# Patient Record
Sex: FEMALE | Race: WHITE | NOT HISPANIC OR LATINO | Employment: FULL TIME | URBAN - METROPOLITAN AREA
[De-identification: names, ages, dates, MRNs, and addresses within clinical notes are randomized per-mention and may not be internally consistent; named-entity substitution may affect disease eponyms.]

---

## 2018-02-01 ENCOUNTER — OFFICE VISIT (OUTPATIENT)
Dept: URGENT CARE | Facility: CLINIC | Age: 43
End: 2018-02-01
Payer: COMMERCIAL

## 2018-02-01 VITALS
WEIGHT: 112 LBS | HEART RATE: 110 BPM | DIASTOLIC BLOOD PRESSURE: 76 MMHG | SYSTOLIC BLOOD PRESSURE: 113 MMHG | BODY MASS INDEX: 21.14 KG/M2 | TEMPERATURE: 101 F | RESPIRATION RATE: 16 BRPM | HEIGHT: 61 IN | OXYGEN SATURATION: 98 %

## 2018-02-01 DIAGNOSIS — J11.1 INFLUENZA: Primary | ICD-10-CM

## 2018-02-01 DIAGNOSIS — R50.9 FEVER, UNSPECIFIED FEVER CAUSE: ICD-10-CM

## 2018-02-01 LAB
CTP QC/QA: YES
CTP QC/QA: YES
FLUAV AG NPH QL: NEGATIVE
FLUBV AG NPH QL: NEGATIVE
S PYO RRNA THROAT QL PROBE: NEGATIVE

## 2018-02-01 PROCEDURE — 87804 INFLUENZA ASSAY W/OPTIC: CPT | Mod: QW,S$GLB,, | Performed by: EMERGENCY MEDICINE

## 2018-02-01 PROCEDURE — 99203 OFFICE O/P NEW LOW 30 MIN: CPT | Mod: S$GLB,,, | Performed by: EMERGENCY MEDICINE

## 2018-02-01 PROCEDURE — 87880 STREP A ASSAY W/OPTIC: CPT | Mod: QW,S$GLB,, | Performed by: EMERGENCY MEDICINE

## 2018-02-01 PROCEDURE — 3008F BODY MASS INDEX DOCD: CPT | Mod: S$GLB,,, | Performed by: EMERGENCY MEDICINE

## 2018-02-01 RX ORDER — OSELTAMIVIR PHOSPHATE 75 MG/1
75 CAPSULE ORAL 2 TIMES DAILY
Qty: 10 CAPSULE | Refills: 0 | Status: SHIPPED | OUTPATIENT
Start: 2018-02-01 | End: 2018-02-06

## 2018-02-01 NOTE — PROGRESS NOTES
"Subjective:       Patient ID: Bobbi Viramontes is a 42 y.o. female.    Vitals:  height is 5' 1" (1.549 m) and weight is 50.8 kg (112 lb). Her temperature is 101.1 °F (38.4 °C) (abnormal). Her blood pressure is 113/76 and her pulse is 110. Her respiration is 16 and oxygen saturation is 98%.     Chief Complaint: Sore Throat and Fever    Sore Throat    This is a new problem. The current episode started in the past 7 days. The problem has been gradually worsening. Associated symptoms include congestion. Pertinent negatives include no abdominal pain, coughing, ear pain, headaches, hoarse voice or shortness of breath.   Fever    Associated symptoms include congestion and a sore throat. Pertinent negatives include no abdominal pain, chest pain, coughing, ear pain, headaches, nausea or wheezing.     Review of Systems   Constitution: Positive for fever. Negative for chills and malaise/fatigue.   HENT: Positive for congestion and sore throat. Negative for ear pain and hoarse voice.    Eyes: Negative for discharge and redness.   Cardiovascular: Negative for chest pain, dyspnea on exertion and leg swelling.   Respiratory: Negative for cough, shortness of breath, sputum production and wheezing.    Musculoskeletal: Negative for myalgias.   Gastrointestinal: Negative for abdominal pain and nausea.   Neurological: Negative for headaches.       Objective:      Physical Exam   Constitutional: She is oriented to person, place, and time. She appears well-developed and well-nourished. She is cooperative.  Non-toxic appearance. She does not appear ill. No distress.   HENT:   Head: Normocephalic and atraumatic.   Right Ear: Hearing, tympanic membrane, external ear and ear canal normal.   Left Ear: Hearing, tympanic membrane, external ear and ear canal normal.   Nose: Nose normal. No mucosal edema, rhinorrhea or nasal deformity. No epistaxis. Right sinus exhibits no maxillary sinus tenderness and no frontal sinus tenderness. Left sinus " exhibits no maxillary sinus tenderness and no frontal sinus tenderness.   Mouth/Throat: Uvula is midline, oropharynx is clear and moist and mucous membranes are normal. No trismus in the jaw. Normal dentition. No uvula swelling. No posterior oropharyngeal erythema.   Eyes: Conjunctivae and lids are normal. Right eye exhibits no discharge. Left eye exhibits no discharge. No scleral icterus.   Sclera clear bilat   Neck: Trachea normal, normal range of motion, full passive range of motion without pain and phonation normal. Neck supple.   Cardiovascular: Normal rate, regular rhythm, normal heart sounds, intact distal pulses and normal pulses.    Pulmonary/Chest: Effort normal and breath sounds normal. No respiratory distress.   Abdominal: Soft. Normal appearance. She exhibits no pulsatile midline mass.   Musculoskeletal: Normal range of motion. She exhibits no edema or deformity.   Neurological: She is alert and oriented to person, place, and time. She exhibits normal muscle tone. Coordination normal.   Skin: Skin is warm, dry and intact. She is not diaphoretic. No pallor.   Psychiatric: She has a normal mood and affect. Her speech is normal and behavior is normal. Judgment and thought content normal. Cognition and memory are normal.   Nursing note and vitals reviewed.      Assessment:       1. Influenza    2. Fever, unspecified fever cause        Plan:         Influenza  -     oseltamivir (TAMIFLU) 75 MG capsule; Take 1 capsule (75 mg total) by mouth 2 (two) times daily.  Dispense: 10 capsule; Refill: 0    Fever, unspecified fever cause  -     POCT Influenza A/B  -     POCT rapid strep A      Influenza screen and strep screen are negative.

## 2020-08-05 ENCOUNTER — OCCUPATIONAL HEALTH (OUTPATIENT)
Dept: URGENT CARE | Facility: CLINIC | Age: 45
End: 2020-08-05
Payer: COMMERCIAL

## 2020-08-05 VITALS — TEMPERATURE: 98 F | OXYGEN SATURATION: 98 %

## 2020-08-05 DIAGNOSIS — Z02.1 PRE-EMPLOYMENT EXAMINATION: Primary | ICD-10-CM

## 2020-08-05 PROCEDURE — 80305 OOH NON-DOT DRUG SCREEN: ICD-10-PCS | Mod: S$GLB,,, | Performed by: FAMILY MEDICINE

## 2020-08-05 PROCEDURE — 99499 PHYSICAL, BASIC COMPLEXITY: ICD-10-PCS | Mod: S$GLB,,, | Performed by: FAMILY MEDICINE

## 2020-08-05 PROCEDURE — U0003 INFECTIOUS AGENT DETECTION BY NUCLEIC ACID (DNA OR RNA); SEVERE ACUTE RESPIRATORY SYNDROME CORONAVIRUS 2 (SARS-COV-2) (CORONAVIRUS DISEASE [COVID-19]), AMPLIFIED PROBE TECHNIQUE, MAKING USE OF HIGH THROUGHPUT TECHNOLOGIES AS DESCRIBED BY CMS-2020-01-R: HCPCS

## 2020-08-05 PROCEDURE — 86580 TB INTRADERMAL TEST: CPT | Mod: S$GLB,,, | Performed by: FAMILY MEDICINE

## 2020-08-05 PROCEDURE — 80305 DRUG TEST PRSMV DIR OPT OBS: CPT | Mod: S$GLB,,, | Performed by: FAMILY MEDICINE

## 2020-08-05 PROCEDURE — 99499 UNLISTED E&M SERVICE: CPT | Mod: S$GLB,,, | Performed by: FAMILY MEDICINE

## 2020-08-05 PROCEDURE — 86580 POCT TB SKIN TEST: ICD-10-PCS | Mod: S$GLB,,, | Performed by: FAMILY MEDICINE

## 2020-08-06 LAB — SARS-COV-2 RNA RESP QL NAA+PROBE: NOT DETECTED

## 2020-08-07 ENCOUNTER — TELEPHONE (OUTPATIENT)
Dept: URGENT CARE | Facility: CLINIC | Age: 45
End: 2020-08-07

## 2020-08-07 NOTE — TELEPHONE ENCOUNTER
Attempt #1 made to patient regarding her COVID-19 (NOT DETECTED) results, but no answer. Unable to LVM. Thanks.

## 2020-08-09 ENCOUNTER — TELEPHONE (OUTPATIENT)
Dept: URGENT CARE | Facility: CLINIC | Age: 45
End: 2020-08-09

## 2020-08-10 ENCOUNTER — TELEPHONE (OUTPATIENT)
Dept: URGENT CARE | Facility: CLINIC | Age: 45
End: 2020-08-10

## 2022-03-17 ENCOUNTER — OFFICE VISIT (OUTPATIENT)
Dept: ENDOCRINOLOGY | Facility: CLINIC | Age: 47
End: 2022-03-17
Payer: COMMERCIAL

## 2022-03-17 VITALS
BODY MASS INDEX: 22.11 KG/M2 | WEIGHT: 112.63 LBS | SYSTOLIC BLOOD PRESSURE: 100 MMHG | DIASTOLIC BLOOD PRESSURE: 68 MMHG | HEIGHT: 60 IN | HEART RATE: 83 BPM | OXYGEN SATURATION: 99 %

## 2022-03-17 DIAGNOSIS — E03.8 HYPOTHYROIDISM DUE TO HASHIMOTO'S THYROIDITIS: ICD-10-CM

## 2022-03-17 DIAGNOSIS — E06.3 HYPOTHYROIDISM DUE TO HASHIMOTO'S THYROIDITIS: ICD-10-CM

## 2022-03-17 PROBLEM — F32.A DEPRESSION: Status: ACTIVE | Noted: 2022-03-17

## 2022-03-17 PROCEDURE — 1159F MED LIST DOCD IN RCRD: CPT | Mod: CPTII,S$GLB,, | Performed by: INTERNAL MEDICINE

## 2022-03-17 PROCEDURE — 99999 PR PBB SHADOW E&M-EST. PATIENT-LVL IV: ICD-10-PCS | Mod: PBBFAC,,, | Performed by: INTERNAL MEDICINE

## 2022-03-17 PROCEDURE — 99999 PR PBB SHADOW E&M-EST. PATIENT-LVL IV: CPT | Mod: PBBFAC,,, | Performed by: INTERNAL MEDICINE

## 2022-03-17 PROCEDURE — 99204 PR OFFICE/OUTPT VISIT, NEW, LEVL IV, 45-59 MIN: ICD-10-PCS | Mod: S$GLB,,, | Performed by: INTERNAL MEDICINE

## 2022-03-17 PROCEDURE — 99204 OFFICE O/P NEW MOD 45 MIN: CPT | Mod: S$GLB,,, | Performed by: INTERNAL MEDICINE

## 2022-03-17 PROCEDURE — 3078F DIAST BP <80 MM HG: CPT | Mod: CPTII,S$GLB,, | Performed by: INTERNAL MEDICINE

## 2022-03-17 PROCEDURE — 3074F SYST BP LT 130 MM HG: CPT | Mod: CPTII,S$GLB,, | Performed by: INTERNAL MEDICINE

## 2022-03-17 PROCEDURE — 3008F BODY MASS INDEX DOCD: CPT | Mod: CPTII,S$GLB,, | Performed by: INTERNAL MEDICINE

## 2022-03-17 PROCEDURE — 1160F RVW MEDS BY RX/DR IN RCRD: CPT | Mod: CPTII,S$GLB,, | Performed by: INTERNAL MEDICINE

## 2022-03-17 PROCEDURE — 3008F PR BODY MASS INDEX (BMI) DOCUMENTED: ICD-10-PCS | Mod: CPTII,S$GLB,, | Performed by: INTERNAL MEDICINE

## 2022-03-17 PROCEDURE — 3078F PR MOST RECENT DIASTOLIC BLOOD PRESSURE < 80 MM HG: ICD-10-PCS | Mod: CPTII,S$GLB,, | Performed by: INTERNAL MEDICINE

## 2022-03-17 PROCEDURE — 3074F PR MOST RECENT SYSTOLIC BLOOD PRESSURE < 130 MM HG: ICD-10-PCS | Mod: CPTII,S$GLB,, | Performed by: INTERNAL MEDICINE

## 2022-03-17 PROCEDURE — 1160F PR REVIEW ALL MEDS BY PRESCRIBER/CLIN PHARMACIST DOCUMENTED: ICD-10-PCS | Mod: CPTII,S$GLB,, | Performed by: INTERNAL MEDICINE

## 2022-03-17 PROCEDURE — 1159F PR MEDICATION LIST DOCUMENTED IN MEDICAL RECORD: ICD-10-PCS | Mod: CPTII,S$GLB,, | Performed by: INTERNAL MEDICINE

## 2022-03-17 RX ORDER — LEVOTHYROXINE SODIUM 88 UG/1
88 TABLET ORAL EVERY MORNING
COMMUNITY
Start: 2022-03-07 | End: 2022-06-10 | Stop reason: SDUPTHER

## 2022-03-17 RX ORDER — PROPRANOLOL HYDROCHLORIDE 10 MG/1
10 TABLET ORAL DAILY
COMMUNITY
Start: 2022-03-07

## 2022-03-17 NOTE — ASSESSMENT & PLAN NOTE
Clinically pt euthyroid. Most recent TSH suppressed after dose of thyroid hormone was increased to 100 mcg despite nl TSH. Has already stopped cytomel. Has been taking 88 mcg for the last few weeks. Recheck thyroid test in 4 weeks. Adjust dose further prn.    Discussed that goal of tx is a normal TSH. Discussed the FT4 and FT3 assays are inherently less accurate. Should avoid exogenous hyperthyroidism as this can accelerate bone loss and increase risk of CV complications/arrthymias.     Reviewed usual  times of thyroid hormone  changes- call if start/ stop ocps, weight changes.

## 2022-03-17 NOTE — PROGRESS NOTES
Subjective:    Patient ID:  Bobbi Viramontes is a 46 y.o. female.    Chief Complaint:  Hypothyroidism      Pt presents to establish care for hypothyroidism and review of chronic medical conditions as listed in the visit diagnosis section of this encounter.     PCP [facility]    With regards to hypothyroidism:    Notes her doses have been adjusted a few times recently.  1/2021 TSH 5's. Dose increased to 88 mcg  3/2021 TSH 1.28 but cytomel was added  11/2021 TSH 0.696. Continued cytomel and levo increased to 100 mcg  2/2022 stopped cytomel and levo decreased to 88 mcg    Current medication:  Generic levothyroxine 88 mcg. Takes thyroid medication properly without food first thing in the morning.     TSH 3 wks ago <0.015.     Current symptoms:   weight gain -ve. Has been exercising and eating better.   fatigue -ve  Anxiety +ve chronic  Notes some moodiness- alternates b/w anxiety and depression  constipation -ve    No hoarseness, voice changes, dysphagia, compressive symptoms, or head/neck exposure to XRT. No personal or FH of thyroid cancer or MEN syndrome. No FH of hypothyroidism.          Review of Systems     Past Medical History:   Diagnosis Date    Anxiety     Depression     Insomnia       Social History     Tobacco Use    Smoking status: Never Smoker    Smokeless tobacco: Never Used   Substance Use Topics    Alcohol use: Yes     Comment: Socially    Drug use: No     Family History   Problem Relation Age of Onset    No Known Problems Mother     No Known Problems Father     No Known Problems Sister     No Known Problems Brother     No Known Problems Daughter     No Known Problems Daughter       Past Surgical History:   Procedure Laterality Date    ADENOIDECTOMY      HYSTERECTOMY  2001    Partial    TOTAL REDUCTION MAMMOPLASTY  2015    Lift only          Current Outpatient Medications:     levothyroxine (SYNTHROID) 88 MCG tablet, Take 88 mcg by mouth every morning., Disp: , Rfl:      propranoloL (INDERAL) 10 MG tablet, Take 10 mg by mouth once daily., Disp: , Rfl:     sertraline (ZOLOFT) 25 MG tablet, Take 25 mg by mouth once daily., Disp: , Rfl:     zolpidem (AMBIEN) 5 MG Tab, Take 1 tablet (5 mg total) by mouth nightly as needed., Disp: 30 tablet, Rfl: 2     Review of patient's allergies indicates:  No Known Allergies     Objective:   /68   Pulse 83   Ht 5' (1.524 m)   Wt 51.1 kg (112 lb 10.5 oz)   SpO2 99%   BMI 22.00 kg/m²   BP Readings from Last 3 Encounters:   03/17/22 100/68   02/01/18 113/76   01/05/17 108/62     Wt Readings from Last 3 Encounters:   03/17/22 1322 51.1 kg (112 lb 10.5 oz)   12/16/21 1655 55.3 kg (122 lb)   08/09/19 1449 55.3 kg (122 lb)          Physical Exam  Vitals reviewed.   Constitutional:       General: She is not in acute distress.     Appearance: Normal appearance. She is well-developed. She is not ill-appearing, toxic-appearing or diaphoretic.   HENT:      Head: Normocephalic and atraumatic.   Eyes:      General: No scleral icterus.  Neck:      Trachea: No tracheal deviation.      Comments:  No thyromegaly or palpable thyroid nodules    Cardiovascular:      Rate and Rhythm: Normal rate and regular rhythm.      Heart sounds: No murmur heard.  Pulmonary:      Effort: Pulmonary effort is normal. No respiratory distress.   Lymphadenopathy:      Cervical: No cervical adenopathy.   Neurological:      Mental Status: She is alert and oriented to person, place, and time.   Psychiatric:         Thought Content: Thought content normal.           Lab Results   Component Value Date     05/11/2021    K 4.2 05/11/2021     05/11/2021    CO2 26 05/11/2021    BUN 13 05/11/2021    CREATININE 0.77 05/11/2021     05/11/2021    AST 23 05/11/2021    ALT 14 05/11/2021    ALBUMIN 4.2 05/11/2021    PROT 6.6 05/11/2021    BILITOT 0.7 05/11/2021    WBC 6.13 11/26/2021    HGB 14.8 11/26/2021    HCT 43.3 11/26/2021    MCV 94 11/26/2021    MCH 32.2 (H)  11/26/2021     11/26/2021    MPV 9.6 11/26/2021    GRAN 3.0 11/26/2021    GRAN 49.7 11/26/2021    LYMPH 2.3 11/26/2021    LYMPH 37.8 11/26/2021    CHOL 179 01/19/2021    HDL 76 (H) 01/19/2021    LDLCALC 89.6 01/19/2021    TRIG 67 01/19/2021       Lab Results   Component Value Date    TSH <0.015 (L) 02/18/2022    FREET4 2.19 02/18/2022        Thyroid Labs Latest Ref Rng & Units 5/11/2021 11/26/2021 2/18/2022   TSH 0.400 - 4.000 uIU/mL - 0.696 <0.015(L)   Free T4 0.78 - 2.19 ng/dL - 1.47 2.19   Sodium 136 - 145 mmol/L 138 - -   Potassium 3.5 - 5.1 mmol/L 4.2 - -   Chloride 95 - 110 mmol/L 103 - -   Carbon Dioxide 22 - 31 mmol/L 26 - -   Glucose 70 - 110 mg/dL 100 - -   Blood Urea Nitrogen 7 - 18 mg/dL 13 - -   Creatinine 0.50 - 1.40 mg/dL 0.77 - -   Calcium 8.4 - 10.2 mg/dL 9.3 - -   Total Protein 6.0 - 8.4 g/dL 6.6 - -   Albumin 3.5 - 5.2 g/dL 4.2 - -   Total Bilirubin 0.2 - 1.3 mg/dL 0.7 - -   AST 14 - 36 U/L 23 - -   ALT 0 - 35 U/L 14 - -   Anion Gap 8 - 16 mmol/L 9 - -   eGFR (African American) >60 mL/min/1.73 m:2 >60 - -   eGFR (Non-African American) >60 mL/min/1.73 m:2 >60 - -   WBC 3.90 - 12.70 K/uL 6.03 6.13 -   RBC 4.00 - 5.40 M/uL 4.64 4.60 -   Hemoglobin 12.0 - 16.0 g/dL 14.8 14.8 -   Hematocrit 37.0 - 48.5 % 43.8 43.3 -   MCV 82 - 98 fL 94 94 -   MCH 27.0 - 31.0 pg 31.9(H) 32.2(H) -   MCHC 32.0 - 36.0 g/dL 33.8 34.2 -   RDW 11.5 - 14.5 % 12.5 11.9 -   Platelets 150 - 450 K/uL 277 282 -   MPV 9.2 - 12.9 fL 9.3 9.6 -   Gran # 1.8 - 7.7 K/uL 3.5 3.0 -   Lymph # 1.0 - 4.8 K/uL 1.9 2.3 -   Mono # 0.3 - 1.0 K/uL 0.5 0.5 -   Eos # 0.0 - 0.5 K/uL 0.2 0.2 -   Baso # 0.00 - 0.20 K/uL 0.03 0.02 -   Gran % 38.0 - 73.0 % 57.4 49.7 -   Lymph % 18.0 - 48.0 % 30.8 37.8 -   Mono% 4.0 - 15.0 % 8.3 8.3 -   Eos % 0.0 - 8.0 % 2.5 3.4 -   Baso % 0.0 - 1.9 % 0.5 0.3 -   Thyroglobulin, Antibody Screen 0.0 - 4.0 IU/mL - - -   Thyroperoxidase Antibodies 0.0 - 9.0 IU/mL - - -         No results found for:  HGBA1C      Assessment and plan:     Problem List Items Addressed This Visit        Endocrine    Hypothyroidism due to Hashimoto's thyroiditis     Clinically pt euthyroid. Most recent TSH suppressed after dose of thyroid hormone was increased to 100 mcg despite nl TSH. Has already stopped cytomel. Has been taking 88 mcg for the last few weeks. Recheck thyroid test in 4 weeks. Adjust dose further prn.    Discussed that goal of tx is a normal TSH. Discussed the FT4 and FT3 assays are inherently less accurate. Should avoid exogenous hyperthyroidism as this can accelerate bone loss and increase risk of CV complications/arrthymias.     Reviewed usual  times of thyroid hormone  changes- call if start/ stop ocps, weight changes.           Relevant Orders    TSH    T4, Free         Labs in 4 weeks    RTC in 1 year          Disclaimer: This note has been generated using voice-recognition software. There may be typographical errors that have been missed during proof-reading.

## 2022-04-27 ENCOUNTER — PATIENT MESSAGE (OUTPATIENT)
Dept: ENDOCRINOLOGY | Facility: CLINIC | Age: 47
End: 2022-04-27
Payer: COMMERCIAL

## 2022-04-27 DIAGNOSIS — E03.8 HYPOTHYROIDISM DUE TO HASHIMOTO'S THYROIDITIS: Primary | ICD-10-CM

## 2022-04-27 DIAGNOSIS — E06.3 HYPOTHYROIDISM DUE TO HASHIMOTO'S THYROIDITIS: Primary | ICD-10-CM

## 2022-05-06 ENCOUNTER — PATIENT MESSAGE (OUTPATIENT)
Dept: ENDOCRINOLOGY | Facility: CLINIC | Age: 47
End: 2022-05-06
Payer: COMMERCIAL

## 2022-06-10 ENCOUNTER — PATIENT MESSAGE (OUTPATIENT)
Dept: ENDOCRINOLOGY | Facility: CLINIC | Age: 47
End: 2022-06-10
Payer: COMMERCIAL

## 2022-06-10 RX ORDER — LEVOTHYROXINE SODIUM 88 UG/1
TABLET ORAL
Qty: 90 TABLET | Refills: 3 | Status: SHIPPED | OUTPATIENT
Start: 2022-06-10 | End: 2022-11-15 | Stop reason: SDUPTHER

## 2022-09-26 ENCOUNTER — TELEPHONE (OUTPATIENT)
Dept: ENDOCRINOLOGY | Facility: CLINIC | Age: 47
End: 2022-09-26
Payer: COMMERCIAL

## 2022-09-26 NOTE — TELEPHONE ENCOUNTER
----- Message from Renee Palmer sent at 9/26/2022  8:36 AM CDT -----  Contact: [phone]  Type: Needs Medical Advice  Who Called:  Pt   Best Call Back Number: [phone]    Additional Information: Pt is calling to schedule appt in [place]. Pls call back and advise. Pt was seeing Dr Sandifer.

## 2022-09-27 ENCOUNTER — TELEPHONE (OUTPATIENT)
Dept: ENDOCRINOLOGY | Facility: CLINIC | Age: 47
End: 2022-09-27
Payer: COMMERCIAL

## 2022-09-27 NOTE — TELEPHONE ENCOUNTER
Left voicemail advising if would like to be seen by Dr. Portillo could call back in Oct for an appt in April, or could keep appt in [place] with Dr. Pressley

## 2022-09-27 NOTE — TELEPHONE ENCOUNTER
----- Message from Gayla Burnett sent at 9/26/2022  2:48 PM CDT -----  Contact: April self [phone]  1MEDICALADVICE     Patient is calling for Medical Advice regarding:    How long has patient had these symptoms:    Pharmacy name and phone#:    Would like response via [employer]:call back    Comments: Pt is requesting a call back from the nurse because she would like to see this provider and that she lives in [place] and not in [place] but there was no schedule for [place]

## 2022-11-15 ENCOUNTER — PATIENT MESSAGE (OUTPATIENT)
Dept: ENDOCRINOLOGY | Facility: CLINIC | Age: 47
End: 2022-11-15
Payer: COMMERCIAL

## 2022-11-15 DIAGNOSIS — E06.3 HYPOTHYROIDISM DUE TO HASHIMOTO'S THYROIDITIS: Primary | ICD-10-CM

## 2022-11-15 DIAGNOSIS — E03.8 HYPOTHYROIDISM DUE TO HASHIMOTO'S THYROIDITIS: Primary | ICD-10-CM

## 2022-11-15 RX ORDER — LEVOTHYROXINE SODIUM 88 UG/1
TABLET ORAL
Qty: 90 TABLET | Refills: 0 | Status: SHIPPED | OUTPATIENT
Start: 2022-11-15 | End: 2022-12-07

## 2022-11-15 NOTE — TELEPHONE ENCOUNTER
Lab Results   Component Value Date    TSH 1.030 06/08/2022    FREET4 1.54 06/08/2022     Labs normal  Refill sent in

## 2023-02-07 ENCOUNTER — OFFICE VISIT (OUTPATIENT)
Dept: ENDOCRINOLOGY | Facility: CLINIC | Age: 48
End: 2023-02-07
Payer: COMMERCIAL

## 2023-02-07 VITALS
SYSTOLIC BLOOD PRESSURE: 102 MMHG | HEART RATE: 70 BPM | BODY MASS INDEX: 23.16 KG/M2 | WEIGHT: 117.94 LBS | DIASTOLIC BLOOD PRESSURE: 70 MMHG | TEMPERATURE: 98 F | HEIGHT: 60 IN | OXYGEN SATURATION: 98 %

## 2023-02-07 DIAGNOSIS — E22.1 HYPERPROLACTINEMIA: ICD-10-CM

## 2023-02-07 DIAGNOSIS — R53.83 FATIGUE, UNSPECIFIED TYPE: ICD-10-CM

## 2023-02-07 DIAGNOSIS — E03.8 HYPOTHYROIDISM DUE TO HASHIMOTO'S THYROIDITIS: Primary | ICD-10-CM

## 2023-02-07 DIAGNOSIS — R23.2 HOT FLASHES: ICD-10-CM

## 2023-02-07 DIAGNOSIS — E06.3 HYPOTHYROIDISM DUE TO HASHIMOTO'S THYROIDITIS: Primary | ICD-10-CM

## 2023-02-07 PROCEDURE — 99999 PR PBB SHADOW E&M-EST. PATIENT-LVL III: CPT | Mod: PBBFAC,,, | Performed by: INTERNAL MEDICINE

## 2023-02-07 PROCEDURE — 1160F PR REVIEW ALL MEDS BY PRESCRIBER/CLIN PHARMACIST DOCUMENTED: ICD-10-PCS | Mod: CPTII,S$GLB,, | Performed by: INTERNAL MEDICINE

## 2023-02-07 PROCEDURE — 3074F SYST BP LT 130 MM HG: CPT | Mod: CPTII,S$GLB,, | Performed by: INTERNAL MEDICINE

## 2023-02-07 PROCEDURE — 1159F MED LIST DOCD IN RCRD: CPT | Mod: CPTII,S$GLB,, | Performed by: INTERNAL MEDICINE

## 2023-02-07 PROCEDURE — 3078F PR MOST RECENT DIASTOLIC BLOOD PRESSURE < 80 MM HG: ICD-10-PCS | Mod: CPTII,S$GLB,, | Performed by: INTERNAL MEDICINE

## 2023-02-07 PROCEDURE — 1160F RVW MEDS BY RX/DR IN RCRD: CPT | Mod: CPTII,S$GLB,, | Performed by: INTERNAL MEDICINE

## 2023-02-07 PROCEDURE — 3074F PR MOST RECENT SYSTOLIC BLOOD PRESSURE < 130 MM HG: ICD-10-PCS | Mod: CPTII,S$GLB,, | Performed by: INTERNAL MEDICINE

## 2023-02-07 PROCEDURE — 1159F PR MEDICATION LIST DOCUMENTED IN MEDICAL RECORD: ICD-10-PCS | Mod: CPTII,S$GLB,, | Performed by: INTERNAL MEDICINE

## 2023-02-07 PROCEDURE — 99214 OFFICE O/P EST MOD 30 MIN: CPT | Mod: S$GLB,,, | Performed by: INTERNAL MEDICINE

## 2023-02-07 PROCEDURE — 3008F BODY MASS INDEX DOCD: CPT | Mod: CPTII,S$GLB,, | Performed by: INTERNAL MEDICINE

## 2023-02-07 PROCEDURE — 3078F DIAST BP <80 MM HG: CPT | Mod: CPTII,S$GLB,, | Performed by: INTERNAL MEDICINE

## 2023-02-07 PROCEDURE — 99214 PR OFFICE/OUTPT VISIT, EST, LEVL IV, 30-39 MIN: ICD-10-PCS | Mod: S$GLB,,, | Performed by: INTERNAL MEDICINE

## 2023-02-07 PROCEDURE — 3008F PR BODY MASS INDEX (BMI) DOCUMENTED: ICD-10-PCS | Mod: CPTII,S$GLB,, | Performed by: INTERNAL MEDICINE

## 2023-02-07 PROCEDURE — 99999 PR PBB SHADOW E&M-EST. PATIENT-LVL III: ICD-10-PCS | Mod: PBBFAC,,, | Performed by: INTERNAL MEDICINE

## 2023-02-07 RX ORDER — LEVOTHYROXINE SODIUM 88 UG/1
TABLET ORAL
Qty: 90 TABLET | Refills: 3 | Status: CANCELLED | OUTPATIENT
Start: 2023-02-07

## 2023-02-07 NOTE — ASSESSMENT & PLAN NOTE
Long hx hypothyroidism. TPO elevated consistent with hashimoto's  Clinically has a few potential symptoms  Last labs normal. Been a while   recheck when able  For now continue 88 mcg/day except 44 mcg on Sundays.    Adjust dose as needed based on results   goal is a normal TSH

## 2023-02-07 NOTE — ASSESSMENT & PLAN NOTE
Seen on labs in the past.  Mild elevation, could be from stress/medication   not having symptoms   recheck when able

## 2023-02-07 NOTE — ASSESSMENT & PLAN NOTE
Ensure euthyroid as above   also check menopause labs.   otherwise f/u with PCP, consider other causes

## 2023-02-07 NOTE — ASSESSMENT & PLAN NOTE
Ensure euthyroid as above   also check iron, anemia, etc   otherwise f/u with PCP to consider other causes

## 2023-02-07 NOTE — PROGRESS NOTES
Subjective:      Chief Complaint: Hypothyroidism    HPI: Bobbi Viramontes is a 47 y.o. female who is here for a follow-up evaluation for thyroid. Last seen 3/2022, though this is her first visit with me.    With regards to her hypothyroidism:  Due to hashimoto's based on elevated TPO. On thyroid medication for a while.    Current medication:  levothyroxine  Current dose: 88 mcg daily, EXCEPT half a tablet on Sundays. 81.7 mcg/day average    Lab Results   Component Value Date    TSH 1.030 06/08/2022    FREET4 1.54 06/08/2022    THYROPEROXID 148.4 (H) 03/29/2021     Doesn't think she is taking biotin   Takes adrenal support, maybe other OTC meds.    Has elevated prolactin.   No breast discharge    Symptoms  No   Yes  [x]    []  Weight gain  []    [x]  Fatigue  []    [x]  Constipation, recently  []    [x]  Cold intolerance, during day feels cold    [x]    []  Weight loss  []    [x]  Insomnia, occasional. Hard to get to sleep, hard to stay asleep. Ambien PRN  [x]    []  Diarrhea  []    [x]  Heat intolerance, more at night  []    [x]  Palpitations. On propranolol.    No menstrual cycles, hx hysterectomy    Some stress      Reviewed past medical, family, social history and updated as appropriate.    Review of Systems  As above    Objective:     Vitals:    02/07/23 1142   BP: 102/70   Pulse: 70   Temp: 98 °F (36.7 °C)     Prior vitals:  BP Readings from Last 5 Encounters:   03/17/22 100/68   02/01/18 113/76   01/05/17 108/62       Physical Exam  Vitals reviewed.   Constitutional:       General: She is not in acute distress.  Neck:      Thyroid: No thyromegaly.   Cardiovascular:      Heart sounds: Normal heart sounds.   Pulmonary:      Effort: Pulmonary effort is normal.       Wt Readings from Last 10 Encounters:   02/07/23 1142 53.5 kg (117 lb 15.1 oz)   03/17/22 1322 51.1 kg (112 lb 10.5 oz)   12/16/21 1655 55.3 kg (122 lb)   08/09/19 1449 55.3 kg (122 lb)   02/01/18 0810 50.8 kg (112 lb)   01/05/17 1434 50.9 kg (112 lb  4.8 oz)       No results found for: HGBA1C  Lab Results   Component Value Date    CHOL 179 01/19/2021    HDL 76 (H) 01/19/2021    LDLCALC 89.6 01/19/2021    TRIG 67 01/19/2021    CHOLHDL 42.5 01/19/2021     Lab Results   Component Value Date     05/11/2021    K 4.2 05/11/2021     05/11/2021    CO2 26 05/11/2021     05/11/2021    BUN 13 05/11/2021    CREATININE 0.77 05/11/2021    CALCIUM 9.3 05/11/2021    PROT 6.6 05/11/2021    ALBUMIN 4.2 05/11/2021    BILITOT 0.7 05/11/2021    ALKPHOS 51 05/11/2021    AST 23 05/11/2021    ALT 14 05/11/2021    ANIONGAP 9 05/11/2021    ESTGFRAFRICA >60 05/11/2021    EGFRNONAA >60 05/11/2021    TSH 1.030 06/08/2022        Assessment/Plan:     Hypothyroidism due to Hashimoto's thyroiditis  Long hx hypothyroidism. TPO elevated consistent with hashimoto's  Clinically has a few potential symptoms  Last labs normal. Been a while   recheck when able  For now continue 88 mcg/day except 44 mcg on Sundays.    Adjust dose as needed based on results   goal is a normal TSH      Hyperprolactinemia  Seen on labs in the past.  Mild elevation, could be from stress/medication   not having symptoms   recheck when able       Hot flashes  Ensure euthyroid as above   also check menopause labs.   otherwise f/u with PCP, consider other causes        Fatigue  Ensure euthyroid as above   also check iron, anemia, etc   otherwise f/u with PCP to consider other causes      Follow up in about 1 year (around 2/7/2024) for lab review, further monitoring.      Lorenzo Pressley MD  Endocrinology

## 2023-02-16 ENCOUNTER — PATIENT MESSAGE (OUTPATIENT)
Dept: ENDOCRINOLOGY | Facility: CLINIC | Age: 48
End: 2023-02-16
Payer: COMMERCIAL

## 2023-02-18 ENCOUNTER — PATIENT MESSAGE (OUTPATIENT)
Dept: ENDOCRINOLOGY | Facility: CLINIC | Age: 48
End: 2023-02-18
Payer: COMMERCIAL

## 2023-02-20 ENCOUNTER — PATIENT MESSAGE (OUTPATIENT)
Dept: ENDOCRINOLOGY | Facility: CLINIC | Age: 48
End: 2023-02-20
Payer: COMMERCIAL

## 2023-02-20 NOTE — TELEPHONE ENCOUNTER
Recent Results (from the past 168 hour(s))   Vitamin B12    Collection Time: 02/16/23  7:49 AM   Result Value Ref Range    Vitamin B-12 721 210 - 950 pg/mL   TSH    Collection Time: 02/16/23  7:49 AM   Result Value Ref Range    TSH 0.846 0.400 - 4.000 uIU/mL   T4, Free    Collection Time: 02/16/23  7:49 AM   Result Value Ref Range    Free T4 1.41 0.78 - 2.19 ng/dL   Prolactin    Collection Time: 02/16/23  7:49 AM   Result Value Ref Range    Prolactin 16.8 5.2 - 26.5 ng/mL   Luteinizing Hormone    Collection Time: 02/16/23  7:49 AM   Result Value Ref Range    LH 5.6 See Text mIU/mL   Follicle Stimulating Hormone    Collection Time: 02/16/23  7:49 AM   Result Value Ref Range    Follicle Stimulating Hormone 10.43 See Text mIU/mL   Estradiol    Collection Time: 02/16/23  7:49 AM   Result Value Ref Range    Estradiol 26 pg/mL   CBC Auto Differential    Collection Time: 02/16/23  7:49 AM   Result Value Ref Range    WBC 5.64 3.90 - 12.70 K/uL    RBC 4.85 4.00 - 5.40 M/uL    Hemoglobin 15.3 12.0 - 16.0 g/dL    Hematocrit 45.3 37.0 - 48.5 %    MCV 93 82 - 98 fL    MCH 31.5 (H) 27.0 - 31.0 pg    MCHC 33.8 32.0 - 36.0 g/dL    RDW 11.9 11.5 - 14.5 %    Platelets 306 150 - 450 K/uL    MPV 9.5 9.2 - 12.9 fL    Immature Granulocytes 0.4 0.0 - 0.5 %    Gran # (ANC) 2.8 1.8 - 7.7 K/uL    Immature Grans (Abs) 0.02 0.00 - 0.04 K/uL    Lymph # 2.1 1.0 - 4.8 K/uL    Mono # 0.5 0.3 - 1.0 K/uL    Eos # 0.2 0.0 - 0.5 K/uL    Baso # 0.02 0.00 - 0.20 K/uL    nRBC 0 0 /100 WBC    Gran % 49.4 38.0 - 73.0 %    Lymph % 37.9 18.0 - 48.0 %    Mono % 8.5 4.0 - 15.0 %    Eosinophil % 3.4 0.0 - 8.0 %    Basophil % 0.4 0.0 - 1.9 %    Differential Method Automated    Ferritin    Collection Time: 02/16/23  7:49 AM   Result Value Ref Range    Ferritin 120 7 - 137 ng/mL     Labs normal    B12, iron normal. No anemia.  Lh, fsh, estradiol normal  TSH, t4 normal  Prolactin normal

## 2023-10-13 DIAGNOSIS — E03.8 HYPOTHYROIDISM DUE TO HASHIMOTO'S THYROIDITIS: ICD-10-CM

## 2023-10-13 DIAGNOSIS — E06.3 HYPOTHYROIDISM DUE TO HASHIMOTO'S THYROIDITIS: ICD-10-CM

## 2023-10-13 RX ORDER — LEVOTHYROXINE SODIUM 88 UG/1
TABLET ORAL
Qty: 90 TABLET | Refills: 3 | Status: SHIPPED | OUTPATIENT
Start: 2023-10-13

## 2024-04-01 ENCOUNTER — TELEPHONE (OUTPATIENT)
Dept: ENDOCRINOLOGY | Facility: CLINIC | Age: 49
End: 2024-04-01
Payer: COMMERCIAL

## 2024-04-01 NOTE — TELEPHONE ENCOUNTER
----- Message from Sayra Bolton sent at 4/1/2024  8:22 AM CDT -----  Type:  Needs Medical Advice    Who Called: pt     Would the patient rather a call back or a response via [facility]?  Call back   Best Call Back Number: [phone]  Additional Information: pt would like to get rescheduled for the appointment that was missed on 02/14/24 for a Yearly thyroid check

## 2024-04-01 NOTE — TELEPHONE ENCOUNTER
----- Message from Sayra Bolton sent at 4/1/2024  8:22 AM CDT -----  Type:  Needs Medical Advice    Who Called: pt     Would the patient rather a call back or a response via [facility]?  Call back   Best Call Back Number: [phone]  Additional Information: pt would like to get rescheduled for the appointment that was missed on 02/14/24 for a Yearly thyroid check

## 2024-05-22 ENCOUNTER — TELEPHONE (OUTPATIENT)
Dept: ENDOCRINOLOGY | Facility: CLINIC | Age: 49
End: 2024-05-22
Payer: COMMERCIAL

## 2024-05-22 NOTE — TELEPHONE ENCOUNTER
S/w pt notified her Dr. Portillo booked until November,will put her on wait list.Pt verb understanding

## 2024-05-22 NOTE — TELEPHONE ENCOUNTER
----- Message from Sharmila Head sent at 5/22/2024 11:01 AM CDT -----  Regarding: return call  Contact: patient  Type:  Patient Returning Call    Who Called:  patient  Who Left Message for Patient:    Does the patient know what this is regarding?:  appointment  Best Call Back Number:  [phone] (home)     Additional Information:    Please call patient to advise.  Thanks!

## 2024-07-08 DIAGNOSIS — E06.3 HYPOTHYROIDISM DUE TO HASHIMOTO'S THYROIDITIS: ICD-10-CM

## 2024-07-08 DIAGNOSIS — E03.8 HYPOTHYROIDISM DUE TO HASHIMOTO'S THYROIDITIS: ICD-10-CM

## 2024-07-08 RX ORDER — LEVOTHYROXINE SODIUM 88 UG/1
TABLET ORAL
Qty: 90 TABLET | Refills: 3 | OUTPATIENT
Start: 2024-07-08

## 2024-10-04 DIAGNOSIS — E06.3 HYPOTHYROIDISM DUE TO HASHIMOTO'S THYROIDITIS: ICD-10-CM

## 2024-10-04 RX ORDER — LEVOTHYROXINE SODIUM 88 UG/1
TABLET ORAL
Qty: 90 TABLET | Refills: 3 | OUTPATIENT
Start: 2024-10-04

## 2025-04-07 ENCOUNTER — OFFICE VISIT (OUTPATIENT)
Dept: ENDOCRINOLOGY | Facility: CLINIC | Age: 50
End: 2025-04-07

## 2025-04-07 DIAGNOSIS — E03.9 HYPOTHYROIDISM, UNSPECIFIED TYPE: Primary | ICD-10-CM

## 2025-04-07 DIAGNOSIS — R53.83 FATIGUE, UNSPECIFIED TYPE: ICD-10-CM

## 2025-04-07 DIAGNOSIS — G47.00 INSOMNIA, UNSPECIFIED TYPE: ICD-10-CM

## 2025-04-07 NOTE — PROGRESS NOTES
The patient location is: [place]    Visit type: audiovisual    Face to Face time with patient: 14  16 minutes of total time spent on the encounter, which includes face to face time and non-face to face time preparing to see the patient (eg, review of tests), Obtaining and/or reviewing separately obtained history, Documenting clinical information in the electronic or other health record, Independently interpreting results (not separately reported) and communicating results to the patient/family/caregiver, or Care coordination (not separately reported).         Each patient to whom he or she provides medical services by telemedicine is:  (1) informed of the relationship between the physician and patient and the respective role of any other health care provider with respect to management of the patient; and (2) notified that he or she may decline to receive medical services by telemedicine and may withdraw from such care at any time.    Notes:   .  CHIEF COMPLAINT:  Hypothyroidism  49 y.o. old being seen as a new patient to me.  Hypothyroid since early 40's. Currently on Synthroid 88 mcg daily. She does have some fatigue regardless of dose of thyroid medication. She does try to exercise. Feels like sleep is worse. Does take ambien. Takes PRN propranolol for palpitations- no increase in usage. Taking LT 4 by itself. No recent missed doses.       PAST MEDICAL HISTORY/PAST SURGICAL HISTORY:  Reviewed in [employer]    SOCIAL HISTORY: Reviewed in [facility]    FAMILY HISTORY:  No known thyroid disease. + heart disease. No DM.     MEDICATIONS/ALLERGIES: The patient's MedCard has been updated and reviewed.            PE:      LABS/Radiology     Latest Reference Range & Units 06/08/22 07:36 02/16/23 07:49 07/09/24 12:42   TSH 0.400 - 4.000 uIU/mL 1.030 0.846 4.920 (H)   Free T4 0.78 - 2.19 ng/dL 1.54 1.41    (H): Data is abnormally high   Latest Reference Range & Units 03/29/21 07:52   Thyroperoxidase Antibodies 0.0 - 9.0 IU/mL 148.4 (H)    (H): Data is abnormally high   Latest Reference Range & Units 05/11/21 07:40   Cortisol, 8 AM 4.30 - 22.40 ug/dL 21.20       ASSESSMENT/PLAN:  Hypothyroidism-she does appear to be taking her thyroid medication appropriately.  She does have some chronic fatigue despite thyroid levels.  She does have some chronic insomnia and anxiety.  Not necessarily any change in the symptoms.  Check TSH    2. Fatigue-discussed fatigue can be multifactorial.  Discussed if having significant insomnia that can contribute to fatigue.  Discussed importance of stress and diet and perception of energy levels.  She is exercising.    3.  Insomnia-currently on Ambien.  Discussed that she could consider CBT-I do Behavioral Health if still having difficulties.        FOLLOWUP  Check TSH

## 2025-04-13 ENCOUNTER — PATIENT MESSAGE (OUTPATIENT)
Dept: ENDOCRINOLOGY | Facility: CLINIC | Age: 50
End: 2025-04-13

## 2025-04-13 DIAGNOSIS — R53.83 FATIGUE, UNSPECIFIED TYPE: Primary | ICD-10-CM

## 2025-04-16 ENCOUNTER — RESULTS FOLLOW-UP (OUTPATIENT)
Dept: ENDOCRINOLOGY | Facility: CLINIC | Age: 50
End: 2025-04-16